# Patient Record
Sex: MALE | Race: BLACK OR AFRICAN AMERICAN | NOT HISPANIC OR LATINO | Employment: FULL TIME | URBAN - METROPOLITAN AREA
[De-identification: names, ages, dates, MRNs, and addresses within clinical notes are randomized per-mention and may not be internally consistent; named-entity substitution may affect disease eponyms.]

---

## 2017-02-21 ENCOUNTER — OFFICE VISIT (OUTPATIENT)
Dept: URGENT CARE | Facility: CLINIC | Age: 52
End: 2017-02-21
Payer: OTHER GOVERNMENT

## 2017-02-21 VITALS
SYSTOLIC BLOOD PRESSURE: 148 MMHG | DIASTOLIC BLOOD PRESSURE: 98 MMHG | WEIGHT: 207.69 LBS | TEMPERATURE: 97 F | BODY MASS INDEX: 26.66 KG/M2 | OXYGEN SATURATION: 97 % | HEIGHT: 74 IN | HEART RATE: 77 BPM

## 2017-02-21 DIAGNOSIS — I10 ESSENTIAL HYPERTENSION: Primary | ICD-10-CM

## 2017-02-21 PROCEDURE — 99999 PR PBB SHADOW E&M-EST. PATIENT-LVL III: CPT | Mod: PBBFAC,,, | Performed by: NURSE PRACTITIONER

## 2017-02-21 PROCEDURE — 99213 OFFICE O/P EST LOW 20 MIN: CPT | Mod: PBBFAC,PO | Performed by: NURSE PRACTITIONER

## 2017-02-21 PROCEDURE — 99213 OFFICE O/P EST LOW 20 MIN: CPT | Mod: S$PBB,,, | Performed by: NURSE PRACTITIONER

## 2017-02-21 RX ORDER — AMLODIPINE BESYLATE 10 MG/1
10 TABLET ORAL DAILY
Qty: 30 TABLET | Refills: 0 | Status: SHIPPED | OUTPATIENT
Start: 2017-02-21 | End: 2018-07-09

## 2017-02-21 NOTE — PROGRESS NOTES
Subjective:       Patient ID: Sage Conn is a 51 y.o. male.    Chief Complaint: Hypertension    HPI Comments: Patient presents to Urgent Care requesting refill for Norvasc 10mg. He was started on this last April and took for 4 months. This what when he was overseas in [place] in the . He stopped medication as he felt that bp was normalized. He has been checking his BP periodically and it has been fairly normal until recently. The last 3 days, patient reports readings of 150's/90's. He has a headache today. 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 He exercises and watches his sodium intake. Patient does not have a Primary Care Physician      Hypertension   This is a new problem. The current episode started more than 1 year ago. The problem has been waxing and waning since onset. The problem is uncontrolled. Associated symptoms include headaches (today). Pertinent negatives include no anxiety, blurred vision, chest pain, malaise/fatigue, neck pain, orthopnea, palpitations, peripheral edema, PND, shortness of breath or sweats. There are no associated agents to hypertension. Risk factors for coronary artery disease include male gender and stress. Past treatments include calcium channel blockers. The current treatment provides significant improvement. Compliance problems: patient felt bp was normal so he stopped it.  There is no history of angina, kidney disease, CAD/MI, CVA, heart failure, left ventricular hypertrophy, PVD, renovascular disease or retinopathy. There is no history of chronic renal disease.     Review of Systems   Constitutional: Negative for activity change, appetite change, chills, diaphoresis, fatigue, fever, malaise/fatigue and unexpected weight change.   Eyes: Negative.  Negative for blurred vision.   Respiratory: Negative for apnea, chest tightness, shortness of breath and stridor.    Cardiovascular: Negative for chest pain, palpitations, orthopnea, leg swelling and  PND.   Gastrointestinal: Negative.    Endocrine: Negative.    Genitourinary: Negative.    Musculoskeletal: Negative for arthralgias, myalgias and neck pain.   Skin: Negative for color change, pallor, rash and wound.   Allergic/Immunologic: Negative.    Neurological: Positive for headaches (today). Negative for dizziness, facial asymmetry and light-headedness.   Hematological: Negative for adenopathy.   Psychiatric/Behavioral: Negative for agitation and behavioral problems.       Objective:      Physical Exam   Constitutional: He is oriented to person, place, and time. He appears well-developed and well-nourished. No distress.   HENT:   Head: Normocephalic and atraumatic.   Eyes: Conjunctivae are normal. Pupils are equal, round, and reactive to light.   Cardiovascular: Normal rate, regular rhythm and normal heart sounds.    Pulmonary/Chest: Effort normal and breath sounds normal. No respiratory distress.   Neurological: He is alert and oriented to person, place, and time.   Skin: Skin is warm and dry. No rash noted. He is not diaphoretic.   Psychiatric: He has a normal mood and affect. His behavior is normal. Judgment and thought content normal.   Nursing note and vitals reviewed.      Assessment:       1. Essential hypertension        Plan:       Sage was seen today for hypertension.    Diagnoses and all orders for this visit:    Essential hypertension  -     amlodipine (NORVASC) 10 MG tablet; Take 1 tablet (10 mg total) by mouth once daily.    1 month supply issued today. Patient will need to choose a Primary Care Physician to establish care for physical and further refills  Patient verbalized understanding.

## 2017-02-21 NOTE — PATIENT INSTRUCTIONS
Follow up with Primary Care Physician of choice for annual exam with refills      Low-Salt Diet  This diet removes foods that are high in salt. It also limits the amount of salt you use when cooking. It is most often used for people with high blood pressure, edema (fluid retention), and kidney, liver, or heart disease.  Table salt contains the mineral sodium. Your body needs sodium to work normally. But too much sodium can make your health problems worse. Your healthcare provider is recommending a low-salt (also called low-sodium) diet for you. Your total daily allowance of salt is 1,500 to 2,300 milligrams (mg). It is less than 1 teaspoon of table salt. This means you can have only about 500 to 700 mg of sodium at each meal. People with certain health problems should limit salt intake to the lower end of the recommended range.    When you cook, dont add much salt. If you can cook without using salt, even better. Dont add salt to your food at the table.  When shopping, read food labels. Salt is often called sodium on the label. Choose foods that are salt-free, low salt, or very low salt. Note that foods with reduced salt may not lower your salt intake enough.    Beans, potatoes, and pasta  Ok: Dry beans, split peas, lentils, potatoes, rice, macaroni, pasta, spaghetti without added salt  Avoid: Potato chips, tortilla chips, and similar products  Breads and cereals  Ok: Low-sodium breads, rolls, cereals, and cakes; low-salt crackers, matzo crackers  Avoid: Salted crackers, pretzels, popcorn, English toast, pancakes, muffins  Dairy  Ok: Milk, chocolate milk, hot chocolate mix, low-salt cheeses, and yogurt  Avoid: Processed cheese and cheese spreads; Roquefort, Camembert, and cottage cheese; buttermilk, instant breakfast drink  Desserts  Ok: Ice cream, frozen yogurt, juice bars, gelatin, cookies and pies, sugar, honey, jelly, hard candy  Avoid: Most pies, cakes and cookies prepared or processed with salt; instant  pudding  Drinks  Ok: Tea, coffee, fizzy (carbonated) drinks, juices  Avoid: Flavored coffees, electrolyte replacement drinks, sports drinks  Meats  Ok: All fresh meat, fish, poultry, low-salt tuna, eggs, egg substitute  Avoid: Smoked, pickled, brine-cured, or salted meats and fish. This includes cavazos, chipped beef, corned beef, hot dogs, deli meats, ham, kosher meats, salt pork, sausage, canned tuna, salted codfish, smoked salmon, herring, sardines, or anchovies.  Seasonings and spices  Ok: Most seasonings are okay. Good substitutes for salt include: fresh herb blends, hot sauce, lemon, garlic, hough, vinegar, dry mustard, parsley, cilantro, horseradish, tomato paste, regular margarine, mayonnaise, unsalted butter, cream cheese, vegetable oil, cream, low-salt salad dressing and gravy.  Avoid: Regular ketchup, relishes, pickles, soy sauce, teriyaki sauce, Worcestershire sauce, BBQ sauce, tartar sauce, meat tenderizer, chili sauce, regular gravy, regular salad dressing, salted butter  Soups  Ok: Low-salt soups and broths made with allowed foods  Avoid: Bouillon cubes, soups with smoked or salted meats, regular soup and broth  Vegetables  Ok: Most vegetables are okay; also low-salt tomato and vegetable juices  Avoid: Sauerkraut and other brine-soaked vegetables; pickles and other pickled vegetables; tomato juice, olives  Date Last Reviewed: 8/1/2016 © 2000-2016 [employer]. [address] [place] [address], [place] [address]. All rights reserved. This information is not intended as a substitute for professional medical care. Always follow your healthcare professional's instructions.

## 2018-07-09 ENCOUNTER — OFFICE VISIT (OUTPATIENT)
Dept: OPHTHALMOLOGY | Facility: CLINIC | Age: 53
End: 2018-07-09
Payer: OTHER GOVERNMENT

## 2018-07-09 DIAGNOSIS — K21.9 CHALASIA: ICD-10-CM

## 2018-07-09 PROCEDURE — 99212 OFFICE O/P EST SF 10 MIN: CPT | Mod: PBBFAC,PO | Performed by: OPHTHALMOLOGY

## 2018-07-09 PROCEDURE — 92004 COMPRE OPH EXAM NEW PT 1/>: CPT | Mod: S$PBB,,, | Performed by: OPHTHALMOLOGY

## 2018-07-09 PROCEDURE — 99999 PR PBB SHADOW E&M-EST. PATIENT-LVL II: CPT | Mod: PBBFAC,,, | Performed by: OPHTHALMOLOGY

## 2018-07-09 NOTE — PROGRESS NOTES
SUBJECTIVE:   Sage Conn is a 52 y.o. male   Uncorrected distance visual acuity was 20/50 -1 in the right eye and 20/20 -2 in the left eye.   Chief Complaint   Patient presents with    Eye Problem        HPI:  HPI     Pt c/o diplopia when he closes his left eye. Also has very blurred vision   OD. C/o swollen eyelid OD, no pain now. Feels lump under RUL     Last edited by Zulma Gee MA on 7/9/2018  4:27 PM. (History)        Assessment /Plan :  1. Chalasia   May also account for concurrent change in vision OD x 4 days from corneal deformation.  Findings and symptoms consistent with Chalazion left upper eyelid and right upper eyelid. The chalzion instruction sheet was reviewed with the pt, recommending frequent warm wet compresses 20-30 min and rpt 3-4 times. Clean lid margins with dilute baby shampoo or ocusoft pads and omega 3 Fish oils orally. If worsens or fails to resolve in approximately 3-4 weeks call back to schedule chalazion excision if desired.

## 2019-01-07 ENCOUNTER — OFFICE VISIT (OUTPATIENT)
Dept: OPHTHALMOLOGY | Facility: CLINIC | Age: 54
End: 2019-01-07
Payer: OTHER GOVERNMENT

## 2019-01-07 DIAGNOSIS — H00.11 CHALAZION OF RIGHT UPPER EYELID: Primary | ICD-10-CM

## 2019-01-07 DIAGNOSIS — H52.201 HYPEROPIA OF RIGHT EYE WITH ASTIGMATISM: ICD-10-CM

## 2019-01-07 DIAGNOSIS — H52.01 HYPEROPIA OF RIGHT EYE WITH ASTIGMATISM: ICD-10-CM

## 2019-01-07 PROCEDURE — 99212 OFFICE O/P EST SF 10 MIN: CPT | Mod: PBBFAC,PO | Performed by: OPTOMETRIST

## 2019-01-07 PROCEDURE — 92012 PR EYE EXAM, EST PATIENT,INTERMED: ICD-10-PCS | Mod: S$PBB,,, | Performed by: OPTOMETRIST

## 2019-01-07 PROCEDURE — 99999 PR PBB SHADOW E&M-EST. PATIENT-LVL II: CPT | Mod: PBBFAC,,, | Performed by: OPTOMETRIST

## 2019-01-07 PROCEDURE — 92015 DETERMINE REFRACTIVE STATE: CPT | Mod: ,,, | Performed by: OPTOMETRIST

## 2019-01-07 PROCEDURE — 99999 PR PBB SHADOW E&M-EST. PATIENT-LVL II: ICD-10-PCS | Mod: PBBFAC,,, | Performed by: OPTOMETRIST

## 2019-01-07 PROCEDURE — 92015 PR REFRACTION: ICD-10-PCS | Mod: ,,, | Performed by: OPTOMETRIST

## 2019-01-07 PROCEDURE — 92012 INTRM OPH EXAM EST PATIENT: CPT | Mod: S$PBB,,, | Performed by: OPTOMETRIST

## 2019-01-07 NOTE — PROGRESS NOTES
HPI     PT was last seen on 7/9/18 with [facility] for chalzaion OD. PT states the bump   on his right upper eye lid has gotten worse since last visit.   Pain Scale:  1  Onset:   July   OD, OS, OU:   OD*  Discharge:   no  A.M. Matting:  no  Itch:   no  Redness:   slight  Photophobia:   no  Foreign body sensation:   no  Deep pain:   no  Previous occurrence:   yes  Drops:   Warm compresses      Last edited by Kellie Olivera MA on 1/7/2019  8:05 AM. (History)            Assessment /Plan     For exam results, see Encounter Report.    Chalazion of right upper eyelid  Non tender chalazion medially RUL  No erythema, no edema  Minimal response to warm compresses  Refer to [facility] for I/C on procedure day    Hyperopia of right eye with astigmatism  Eyeglass Final Rx     Eyeglass Final Rx       Sphere Cylinder Axis Add    Right +1.50 +0.75 045 +2.00    Left [place]   +2.00    Expiration Date:  1/8/2020    Comments:  SV distance OK          Eyeglass Final Rx #2       Sphere Cylinder Axis Add    Right +3.50 +0.75 045     Left +2.00       Type:  SVL    Expiration Date:  1/8/2020    Comments:  Reading only              Trial framed for pt, discussed adaptation time due to aniso  Discussed cls OD and fitting after chal procedure    RTC for chalazion on procedure day with [facility]

## 2019-02-05 ENCOUNTER — TELEPHONE (OUTPATIENT)
Dept: OPHTHALMOLOGY | Facility: CLINIC | Age: 54
End: 2019-02-05

## 2019-02-05 NOTE — TELEPHONE ENCOUNTER
Patient returned my call and asked if he could be sked for an earlier time than 1045 for procedure.  I told him that [facility] does procedures between 1556-7518 on Wednesday mornings after he arrives from surgery, therefore, an earlier time is not available.  Patient decided to keep his date of 2/5/19 @ 1045.

## 2019-02-05 NOTE — TELEPHONE ENCOUNTER
LM advising that I did receive message to reskd chalazion excision that is sked for 2/6/19.  Advised that next available date is 2/27.

## 2019-02-05 NOTE — TELEPHONE ENCOUNTER
----- Message from Delmy oCnn sent at 2/5/2019  9:11 AM CST -----  Contact: pt  Need to reschedule proc for Chalazion Excision pls call [phone]

## 2019-02-06 ENCOUNTER — PROCEDURE VISIT (OUTPATIENT)
Dept: OPHTHALMOLOGY | Facility: CLINIC | Age: 54
End: 2019-02-06
Payer: OTHER GOVERNMENT

## 2019-02-06 DIAGNOSIS — H00.11 CHALAZION OF RIGHT UPPER EYELID: Primary | ICD-10-CM

## 2019-02-06 PROCEDURE — 67800 REMOVE EYELID LESION: CPT | Mod: S$PBB,E3,, | Performed by: OPHTHALMOLOGY

## 2019-02-06 PROCEDURE — 67800 REMOVE EYELID LESION: CPT | Mod: PBBFAC,PN | Performed by: OPHTHALMOLOGY

## 2019-02-06 PROCEDURE — 92012 PR EYE EXAM, EST PATIENT,INTERMED: ICD-10-PCS | Mod: 25,S$PBB,, | Performed by: OPHTHALMOLOGY

## 2019-02-06 PROCEDURE — 92012 INTRM OPH EXAM EST PATIENT: CPT | Mod: 25,S$PBB,, | Performed by: OPHTHALMOLOGY

## 2019-02-06 PROCEDURE — 67800 PR EXCIS CHALAZION,SINGLE: ICD-10-PCS | Mod: S$PBB,E3,, | Performed by: OPHTHALMOLOGY

## 2019-02-06 RX ORDER — ERYTHROMYCIN 5 MG/G
OINTMENT OPHTHALMIC 4 TIMES DAILY
Qty: 1 TUBE | Refills: 3 | Status: SHIPPED | OUTPATIENT
Start: 2019-02-06 | End: 2020-11-12

## 2019-02-06 NOTE — PROGRESS NOTES
SUBJECTIVE:   Sage Conn is a 53 y.o. male   Uncorrected distance visual acuity was 20/25 -2 in the right eye and 20/20 in the left eye.   Chief Complaint   Patient presents with    Eye Problem     no eye drops        HPI:  HPI     Eye Problem      Additional comments: no eye drops              Comments     Referred by Dr. Kee    Chalazion right upper eyelid for 2 to 2 1/2 months  Patient tried the hot compresses  No pain  Double vision in his right eye  No double vision left eye    1. Chalazion RUL x 2 to 2 1/2 months    No eye drops          Last edited by Margarita Escobar on 2/6/2019 10:38 AM. (History)        Assessment /Plan :  1. Chalazion of right upper eyelid CHALAZION EXCISION PROCEDURE NOTE:    53 y.o. year old symptomatic patient with history of non-resolving chalazion despite attempts at conservative treatment, who desires chalazion excision.      PROCEDURE: After a thorough explanation of the risks, benefits and alternatives to chalazion excision of right upper eyelid including scarring, pain, infection, change in vision, and recurrence, the patient signed the consent. Ak-taine solution was placed in both eyes. The skin was cleaned with alcohol.2 cc of 2% lidocaine with epinephrine was injected adjacent to the chalazion. Next the skin was prepped with povodine and draped. A chalazion clamp was positioned over the lesion and the lid everted. An # 11 blade was used to incise the palpebral conjunctiva and the oils removed with a curette. The sac was excised with 0.3 forceps and Sharda scissors. Hemostasis was established with hand held cautery. Antibiotic ointment and a patch were placed over the eye.    IMPRESSION: Chalazion Excision of right upper eyelid well tolerated    PLAN:  1) Erythromycin ointment to affected eye qid. X 4 days   2) RTC prn

## 2020-11-10 ENCOUNTER — TELEPHONE (OUTPATIENT)
Dept: OPHTHALMOLOGY | Facility: CLINIC | Age: 55
End: 2020-11-10

## 2020-11-12 ENCOUNTER — OFFICE VISIT (OUTPATIENT)
Dept: OPHTHALMOLOGY | Facility: CLINIC | Age: 55
End: 2020-11-12
Payer: OTHER GOVERNMENT

## 2020-11-12 DIAGNOSIS — H00.021 HORDEOLUM INTERNUM OF RIGHT UPPER EYELID: Primary | ICD-10-CM

## 2020-11-12 PROCEDURE — 92012 PR EYE EXAM, EST PATIENT,INTERMED: ICD-10-PCS | Mod: S$PBB,,, | Performed by: OPTOMETRIST

## 2020-11-12 PROCEDURE — 99212 OFFICE O/P EST SF 10 MIN: CPT | Mod: PBBFAC | Performed by: OPTOMETRIST

## 2020-11-12 PROCEDURE — 99999 PR PBB SHADOW E&M-EST. PATIENT-LVL II: ICD-10-PCS | Mod: PBBFAC,,, | Performed by: OPTOMETRIST

## 2020-11-12 PROCEDURE — 92012 INTRM OPH EXAM EST PATIENT: CPT | Mod: S$PBB,,, | Performed by: OPTOMETRIST

## 2020-11-12 PROCEDURE — 99999 PR PBB SHADOW E&M-EST. PATIENT-LVL II: CPT | Mod: PBBFAC,,, | Performed by: OPTOMETRIST

## 2020-11-12 RX ORDER — ERYTHROMYCIN 5 MG/G
OINTMENT OPHTHALMIC
Qty: 1 TUBE | Refills: 0 | Status: SHIPPED | OUTPATIENT
Start: 2020-11-12 | End: 2023-05-09

## 2020-11-12 NOTE — PROGRESS NOTES
HPI     Eye Problem     Comments: OD              Comments     Patient states about 4-5 days ago he woke up with right eye very   irritated with a little swelling, painful, watery, and increased with   swelling since then.  Pain Scale: 2  Onset: 4-5 days ago   OD, OS, OU: OD  Discharge: Watery   A.M. Matting: Little  Itch: It was but no longer   Redness: No  Photophobia: Little blurred   Foreign body sensation: No  Deep pain: No  Previous occurrence: Yes  Drops: None          Last edited by Aarti Romero on 11/12/2020  3:55 PM. (History)            Assessment /Plan     For exam results, see Encounter Report.    Hordeolum internum of right upper eyelid    Other orders  -     erythromycin (ROMYCIN) ophthalmic ointment; Place on affected area three times daily  Dispense: 1 Tube; Refill: 0  Begin warm compresses tid-qid OS for 10-15 minutes  Emycin judith tid OD       RTC PRN if symptoms worsen or not resolved x 1 week  Otherwise, RTC for dilated exam  Discussed above and all questions were answered.

## 2020-11-27 ENCOUNTER — OFFICE VISIT (OUTPATIENT)
Dept: OPHTHALMOLOGY | Facility: CLINIC | Age: 55
End: 2020-11-27
Payer: OTHER GOVERNMENT

## 2020-11-27 DIAGNOSIS — H52.4 PRESBYOPIA: ICD-10-CM

## 2020-11-27 DIAGNOSIS — H00.11 CHALAZION OF RIGHT UPPER EYELID: Primary | ICD-10-CM

## 2020-11-27 PROCEDURE — 99212 OFFICE O/P EST SF 10 MIN: CPT | Mod: PBBFAC | Performed by: OPTOMETRIST

## 2020-11-27 PROCEDURE — 92014 COMPRE OPH EXAM EST PT 1/>: CPT | Mod: S$PBB,,, | Performed by: OPTOMETRIST

## 2020-11-27 PROCEDURE — 99999 PR PBB SHADOW E&M-EST. PATIENT-LVL II: CPT | Mod: PBBFAC,,, | Performed by: OPTOMETRIST

## 2020-11-27 PROCEDURE — 99999 PR PBB SHADOW E&M-EST. PATIENT-LVL II: ICD-10-PCS | Mod: PBBFAC,,, | Performed by: OPTOMETRIST

## 2020-11-27 PROCEDURE — 92014 PR EYE EXAM, EST PATIENT,COMPREHESV: ICD-10-PCS | Mod: S$PBB,,, | Performed by: OPTOMETRIST

## 2020-11-28 NOTE — PROGRESS NOTES
PADMAJA Sweeney     Comments: hordeolum RUL              Comments     Pt here for routine exam and hordeolum chk. Pt still has hordeolum   present RUL. He says it subsided a little bit, but not much. He says it is   still painful to the touch. He is using erythromycin BID. Pt also says he   cannot see as well at a distance, and that he may need some glasses.           Last edited by Compa Haro, Patient Care Assistant on 11/27/2020  3:32   PM. (History)            Assessment /Plan     For exam results, see Encounter Report.    Chalazion of right upper eyelid  Persistent chalazion with minimal improvement with compresses  Consult [facility] for eval on proc day    Presbyopia  Declined refraction today  Ok to c/w OTC readers    RTC for chalazion eval with Dr Olmedo or PEGGY  Discussed above and all questions were answered.

## 2020-12-02 ENCOUNTER — PROCEDURE VISIT (OUTPATIENT)
Dept: OPHTHALMOLOGY | Facility: CLINIC | Age: 55
End: 2020-12-02
Payer: OTHER GOVERNMENT

## 2020-12-02 DIAGNOSIS — H00.11 CHALAZION OF RIGHT UPPER EYELID: Primary | ICD-10-CM

## 2020-12-02 PROCEDURE — 92012 INTRM OPH EXAM EST PATIENT: CPT | Mod: 25,S$PBB,, | Performed by: OPHTHALMOLOGY

## 2020-12-02 PROCEDURE — 67800 REMOVE EYELID LESION: CPT | Mod: PBBFAC,RT | Performed by: OPHTHALMOLOGY

## 2020-12-02 PROCEDURE — 67800 REMOVE EYELID LESION: CPT | Mod: S$PBB,E3,, | Performed by: OPHTHALMOLOGY

## 2020-12-02 PROCEDURE — 67800 PR EXCIS CHALAZION,SINGLE: ICD-10-PCS | Mod: S$PBB,E3,, | Performed by: OPHTHALMOLOGY

## 2020-12-02 PROCEDURE — 92012 PR EYE EXAM, EST PATIENT,INTERMED: ICD-10-PCS | Mod: 25,S$PBB,, | Performed by: OPHTHALMOLOGY

## 2020-12-02 RX ORDER — ERYTHROMYCIN 5 MG/G
OINTMENT OPHTHALMIC 4 TIMES DAILY
Qty: 1 TUBE | Refills: 3 | Status: SHIPPED | OUTPATIENT
Start: 2020-12-02 | End: 2020-12-09

## 2020-12-02 NOTE — PROGRESS NOTES
SUBJECTIVE  Sage Fabien is 54 y.o. male  Uncorrected distance visual acuity was 20/25 -2 in the right eye and 20/20 in the left eye.   Chief Complaint   Patient presents with    Stye     Chalazion excision RUL          HPI     Stye      Additional comments: Chalazion excision RUL              Comments     Pt here for chalazion excision RUL. Pt has tried all other therapies with   no relief.           Last edited by Compa Haro, Patient Care Assistant on 12/2/2020  2:45   PM. (History)         Assessment /Plan :  1. Chalazion of right upper eyelid CHALAZION EXCISION PROCEDURE NOTE:    54 y.o. year old symptomatic patient with history of non-resolving chalazion despite attempts at conservative treatment, who desires chalazion excision.      PROCEDURE: After a thorough explanation of the risks, benefits and alternatives to chalazion excision of right upper eyelid including scarring, pain, infection, change in vision, and recurrence, the patient signed the consent. Ak-taine solution was placed in both eyes. The skin was cleaned with alcohol.1.5 cc of 2% lidocaine with epinephrine was injected adjacent to the chalazion. Next the skin was prepped with povodine and draped. A chalazion clamp was positioned over the lesion and the lid everted. An # 11 blade was used to incise the palpebral conjunctiva and the oils removed with a curette. The sac was excised with 0.3 forceps and Sharda scissors. Hemostasis was established with hand held cautery. Antibiotic ointment and a patch were placed over the eye.    IMPRESSION: Chalazion Excision of right upper eyelid well tolerated    PLAN:  1) Erythromycin ointment to affected eye qid. X 4 days   2) RTC prn

## 2021-04-28 ENCOUNTER — PATIENT MESSAGE (OUTPATIENT)
Dept: RESEARCH | Facility: HOSPITAL | Age: 56
End: 2021-04-28

## 2023-05-05 DIAGNOSIS — Z76.89 ENCOUNTER TO ESTABLISH CARE: Primary | ICD-10-CM

## 2023-05-09 ENCOUNTER — HOSPITAL ENCOUNTER (OUTPATIENT)
Dept: CARDIOLOGY | Facility: HOSPITAL | Age: 58
Discharge: HOME | End: 2023-05-09
Attending: INTERNAL MEDICINE
Payer: OTHER GOVERNMENT

## 2023-05-09 ENCOUNTER — OFFICE VISIT (OUTPATIENT)
Dept: CARDIOLOGY | Facility: CLINIC | Age: 58
End: 2023-05-09
Payer: OTHER GOVERNMENT

## 2023-05-09 VITALS
HEIGHT: 74 IN | WEIGHT: 218.06 LBS | DIASTOLIC BLOOD PRESSURE: 92 MMHG | BODY MASS INDEX: 27.99 KG/M2 | BODY MASS INDEX: 27.98 KG/M2 | WEIGHT: 218.06 LBS | SYSTOLIC BLOOD PRESSURE: 138 MMHG

## 2023-05-09 DIAGNOSIS — G47.30 SLEEP APNEA, UNSPECIFIED TYPE: ICD-10-CM

## 2023-05-09 DIAGNOSIS — Z76.89 ENCOUNTER TO ESTABLISH CARE: ICD-10-CM

## 2023-05-09 DIAGNOSIS — I10 ESSENTIAL HYPERTENSION: Primary | ICD-10-CM

## 2023-05-09 PROCEDURE — 93005 ELECTROCARDIOGRAM TRACING: CPT

## 2023-05-09 PROCEDURE — 99999 PR PBB SHADOW E&M-EST. PATIENT-LVL IV: CPT | Mod: PBBFAC,,, | Performed by: INTERNAL MEDICINE

## 2023-05-09 PROCEDURE — 99999 PR PBB SHADOW E&M-EST. PATIENT-LVL IV: ICD-10-PCS | Mod: PBBFAC,,, | Performed by: INTERNAL MEDICINE

## 2023-05-09 PROCEDURE — 93010 EKG 12-LEAD: ICD-10-PCS | Mod: ,,, | Performed by: INTERNAL MEDICINE

## 2023-05-09 PROCEDURE — 99203 PR OFFICE/OUTPT VISIT, NEW, LEVL III, 30-44 MIN: ICD-10-PCS | Mod: S$PBB,,, | Performed by: INTERNAL MEDICINE

## 2023-05-09 PROCEDURE — 93010 ELECTROCARDIOGRAM REPORT: CPT | Mod: ,,, | Performed by: INTERNAL MEDICINE

## 2023-05-09 PROCEDURE — 99203 OFFICE O/P NEW LOW 30 MIN: CPT | Mod: S$PBB,,, | Performed by: INTERNAL MEDICINE

## 2023-05-09 PROCEDURE — 99214 OFFICE O/P EST MOD 30 MIN: CPT | Mod: PBBFAC | Performed by: INTERNAL MEDICINE

## 2023-05-09 RX ORDER — AMLODIPINE BESYLATE 10 MG/1
10 TABLET ORAL DAILY
Qty: 30 TABLET | Refills: 3 | Status: SHIPPED | OUTPATIENT
Start: 2023-05-09 | End: 2023-09-29 | Stop reason: SDUPTHER

## 2023-05-09 RX ORDER — OLMESARTAN MEDOXOMIL 20 MG/1
20 TABLET ORAL DAILY
Qty: 30 TABLET | Refills: 3 | Status: SHIPPED | OUTPATIENT
Start: 2023-05-09 | End: 2024-05-08

## 2023-05-09 NOTE — PROGRESS NOTES
Subjective:   Patient ID:  Sage Conn is a 57 y.o. male who presents for cardiac consult of Hypertension      HPI  The patient came in today for cardiac consult of Hypertension    5/9/23  Sage Conn is a 57 y.o. male pt with HTN, overweight presents for CV eval of HTN.     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 His wife has been sick as well. BP at home - 165/100s, 120s/90s. 216/138.   He took Lisinopril 40, Norvasc 10 recently. He was on BP meds in the past. Has occ worse vision.     Patient feels  no chest pain, no sob, no leg swelling, no PND, no palpitation, no dizziness, no syncope, no CNS symptoms.    Patient has fairly good exercise tolerance. CRNA at [employer]    Patient is compliant with medications.    ECG - NSR    History reviewed. No pertinent past medical history.    History reviewed. No pertinent surgical history.    Social History     Tobacco Use    Smoking status: Never   Substance Use Topics    Alcohol use: Yes    Drug use: No       History reviewed. No pertinent family history.    Patient's Medications   New Prescriptions    AMLODIPINE (NORVASC) 10 MG TABLET    Take 1 tablet (10 mg total) by mouth once daily.    OLMESARTAN (BENICAR) 20 MG TABLET    Take 1 tablet (20 mg total) by mouth once daily.   Previous Medications    No medications on file   Modified Medications    No medications on file   Discontinued Medications    ERYTHROMYCIN (ROMYCIN) OPHTHALMIC OINTMENT    Place on affected area three times daily       Review of Systems   Constitutional: Negative.    HENT: Negative.     Eyes: Negative.    Respiratory: Negative.     Cardiovascular: Negative.    Gastrointestinal: Negative.    Genitourinary: Negative.    Musculoskeletal: Negative.    Skin: Negative.    Neurological: Negative.    Endo/Heme/Allergies: Negative.    Psychiatric/Behavioral: Negative.     All 12 systems otherwise negative.    Wt Readings from Last 3 Encounters:   05/09/23 98.9 kg (218  "lb 0.6 oz)   05/09/23 98.9 kg (218 lb 0.6 oz)   02/21/17 94.2 kg (207 lb 10.8 oz)     Temp Readings from Last 3 Encounters:   02/21/17 97.4 °F (36.3 °C) (Tympanic)   07/15/15 97.2 °F (36.2 °C) (Tympanic)   04/30/15 98.5 °F (36.9 °C) (Oral)     BP Readings from Last 3 Encounters:   05/09/23 (!) 138/92   02/21/17 (!) 148/98   07/15/15 128/90     Pulse Readings from Last 3 Encounters:   02/21/17 77   07/15/15 102   04/30/15 81       BP (!) 138/92 (BP Location: Left arm, Patient Position: Sitting)   Ht 6' 2" (1.88 m)   Wt 98.9 kg (218 lb 0.6 oz)   BMI 27.99 kg/m²     Objective:   Physical Exam  Vitals and nursing note reviewed.   Constitutional:       General: He is not in acute distress.     Appearance: He is well-developed. He is not diaphoretic.   HENT:      Head: Normocephalic and atraumatic.      Nose: Nose normal.   Eyes:      General: No scleral icterus.     Conjunctiva/sclera: Conjunctivae normal.   Neck:      Thyroid: No thyromegaly.      Vascular: No JVD.   Cardiovascular:      Rate and Rhythm: Normal rate and regular rhythm.      Heart sounds: S1 normal and S2 normal. No murmur heard.    No friction rub. No gallop. No S3 or S4 sounds.   Pulmonary:      Effort: Pulmonary effort is normal. No respiratory distress.      Breath sounds: Normal breath sounds. No stridor. No wheezing or rales.   Chest:      Chest wall: No tenderness.   Abdominal:      General: Bowel sounds are normal. There is no distension.      Palpations: Abdomen is soft. There is no mass.      Tenderness: There is no abdominal tenderness. There is no rebound.   Genitourinary:     Comments: Deferred  Musculoskeletal:         General: No tenderness or deformity. Normal range of motion.      Cervical back: Normal range of motion and neck supple.   Lymphadenopathy:      Cervical: No cervical adenopathy.   Skin:     General: Skin is warm and dry.      Coloration: Skin is not pale.      Findings: No erythema or rash.   Neurological:      Mental " Status: He is alert and oriented to person, place, and time.      Motor: No abnormal muscle tone.      Coordination: Coordination normal.   Psychiatric:         Behavior: Behavior normal.         Thought Content: Thought content normal.         Judgment: Judgment normal.       No results found for: NA, K, CL, CO2, BUN, CREATININE, GLU, HGBA1C, MG, AST, ALT, ALBUMIN, PROT, BILITOT, WBC, HGB, HCT, MCV, PLT, INR, TSH, CHOL, HDL, LDLCALC, TRIG, BNP  Assessment:      1. Essential hypertension    2. BMI 27.0-27.9,adult    3. Sleep apnea, unspecified type        Plan:     HTN - occ elevated 200s systolics   - start Norvasc 10mg and Benicar 20mg  - r/o KRISTINE  - order ECG stress test     2. Overweight, BMI 27  - cont weight loss with diet/exercise     Refer to PCP     Thank you for allowing me to participate in this patient's care. Please do not hesitate to contact me with any questions or concerns. Consult note has been forwarded to the referral physician.

## 2023-05-22 ENCOUNTER — TELEPHONE (OUTPATIENT)
Dept: CARDIOLOGY | Facility: CLINIC | Age: 58
End: 2023-05-22
Payer: OTHER GOVERNMENT

## 2023-05-22 NOTE — TELEPHONE ENCOUNTER
Olmesartan 20mg approved. Attempted to reach pt, VMB is full, unable to LVM    Approved today  CaseId:[id number];Status:Approved;Review Type:Prior Auth;Coverage Start Date:04/22/2023;Coverage End Date:12/31/2099;

## 2023-05-23 ENCOUNTER — TELEPHONE (OUTPATIENT)
Dept: CARDIOLOGY | Facility: CLINIC | Age: 58
End: 2023-05-23
Payer: OTHER GOVERNMENT

## 2023-05-23 PROBLEM — I10 HYPERTENSION: Status: ACTIVE | Noted: 2023-05-23

## 2023-05-23 PROBLEM — Z12.11 ENCOUNTER FOR SCREENING COLONOSCOPY: Status: ACTIVE | Noted: 2023-05-23

## 2023-05-23 PROBLEM — Z28.9 DELAYED IMMUNIZATIONS: Status: ACTIVE | Noted: 2023-05-23

## 2023-05-23 NOTE — TELEPHONE ENCOUNTER
Spoke with pt in regards to     Medication approved.      Pt verbalized understanding with no questions or concerns   ----- Message -----  From: Silvia Pitts MA  Sent: 5/19/2023   3:30 PM CDT  To: Janae Verdin LPN    Please advise   ----- Message -----  From: Damon Gilmore  Sent: 5/19/2023   3:26 PM CDT  To: Marek Cormier Staff    Type:  Patient Call          Who Called: patient         Does the patient know what this is regarding?: Requesting a call back about Rx olmesartan (BENICAR) 20 MG tablet pt said that the Rx needa a PA and he's been waiting for a while for his medicaine ; please advise           Would the patient rather a call back or a response via [facility]? Call           Best Call Back Number: [phone]            Additional Information:

## 2023-05-24 ENCOUNTER — HOSPITAL ENCOUNTER (OUTPATIENT)
Dept: CARDIOLOGY | Facility: HOSPITAL | Age: 58
Discharge: HOME | End: 2023-05-24
Attending: INTERNAL MEDICINE
Payer: OTHER GOVERNMENT

## 2023-05-24 DIAGNOSIS — I10 ESSENTIAL HYPERTENSION: ICD-10-CM

## 2023-05-24 DIAGNOSIS — G47.30 SLEEP APNEA, UNSPECIFIED TYPE: ICD-10-CM

## 2023-05-24 LAB
CV STRESS BASE HR: 74 BPM
DIASTOLIC BLOOD PRESSURE: 91 MMHG
OHS CV CPX 1 MINUTE RECOVERY HEART RATE: 130 BPM
OHS CV CPX 85 PERCENT MAX PREDICTED HEART RATE MALE: 139
OHS CV CPX ESTIMATED METS: 12
OHS CV CPX MAX PREDICTED HEART RATE: 163
OHS CV CPX PATIENT IS FEMALE: 0
OHS CV CPX PATIENT IS MALE: 1
OHS CV CPX PEAK DIASTOLIC BLOOD PRESSURE: 54 MMHG
OHS CV CPX PEAK HEAR RATE: 151 BPM
OHS CV CPX PEAK RATE PRESSURE PRODUCT: NORMAL
OHS CV CPX PEAK SYSTOLIC BLOOD PRESSURE: 212 MMHG
OHS CV CPX PERCENT MAX PREDICTED HEART RATE ACHIEVED: 93
OHS CV CPX RATE PRESSURE PRODUCT PRESENTING: NORMAL
STRESS ECHO POST EXERCISE DUR MIN: 10 MINUTES
STRESS ECHO POST EXERCISE DUR SEC: 12 SECONDS
SYSTOLIC BLOOD PRESSURE: 157 MMHG

## 2023-05-24 PROCEDURE — 93016 EXERCISE STRESS - EKG (CUPID ONLY): ICD-10-PCS | Mod: ,,, | Performed by: INTERNAL MEDICINE

## 2023-05-24 PROCEDURE — 93018 CV STRESS TEST I&R ONLY: CPT | Mod: ,,, | Performed by: INTERNAL MEDICINE

## 2023-05-24 PROCEDURE — 93016 CV STRESS TEST SUPVJ ONLY: CPT | Mod: ,,, | Performed by: INTERNAL MEDICINE

## 2023-05-24 PROCEDURE — 93017 CV STRESS TEST TRACING ONLY: CPT

## 2023-05-24 PROCEDURE — 93018 EXERCISE STRESS - EKG (CUPID ONLY): ICD-10-PCS | Mod: ,,, | Performed by: INTERNAL MEDICINE

## 2023-05-31 ENCOUNTER — PATIENT MESSAGE (OUTPATIENT)
Dept: PULMONOLOGY | Facility: CLINIC | Age: 58
End: 2023-05-31
Payer: OTHER GOVERNMENT

## 2023-07-07 ENCOUNTER — PATIENT MESSAGE (OUTPATIENT)
Dept: INFECTIOUS DISEASES | Facility: CLINIC | Age: 58
End: 2023-07-07
Payer: OTHER GOVERNMENT

## 2023-09-29 RX ORDER — AMLODIPINE BESYLATE 10 MG/1
10 TABLET ORAL DAILY
Qty: 90 TABLET | Refills: 1 | Status: SHIPPED | OUTPATIENT
Start: 2023-09-29 | End: 2024-04-01 | Stop reason: SDUPTHER

## 2024-04-01 ENCOUNTER — TELEPHONE (OUTPATIENT)
Dept: CARDIOLOGY | Facility: CLINIC | Age: 59
End: 2024-04-01
Payer: OTHER GOVERNMENT

## 2024-04-01 RX ORDER — AMLODIPINE BESYLATE 10 MG/1
10 TABLET ORAL DAILY
Qty: 90 TABLET | Refills: 0 | Status: SHIPPED | OUTPATIENT
Start: 2024-04-01 | End: 2025-04-01